# Patient Record
Sex: FEMALE | Race: WHITE
[De-identification: names, ages, dates, MRNs, and addresses within clinical notes are randomized per-mention and may not be internally consistent; named-entity substitution may affect disease eponyms.]

---

## 2018-04-04 ENCOUNTER — HOSPITAL ENCOUNTER (OUTPATIENT)
Dept: HOSPITAL 92 - BICULT | Age: 38
Discharge: HOME | End: 2018-04-04
Payer: COMMERCIAL

## 2018-04-04 PROCEDURE — 76536 US EXAM OF HEAD AND NECK: CPT

## 2019-04-21 ENCOUNTER — HOSPITAL ENCOUNTER (EMERGENCY)
Dept: HOSPITAL 57 - BURERS | Age: 39
Discharge: HOME | End: 2019-04-21
Payer: COMMERCIAL

## 2019-04-21 DIAGNOSIS — Z79.899: ICD-10-CM

## 2019-04-21 DIAGNOSIS — F17.210: ICD-10-CM
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
## 2019-04-21 DIAGNOSIS — S80.01XA: Primary | ICD-10-CM

## 2019-04-21 NOTE — RAD
RIGHT KNEE FOUR VIEWS

4/21/19

 

No fracture or large joint effusion was seen. At most, there might be a small effusion. All bones hernan
eared intact. The articular surfaces are normal in appearance.

 

IMPRESSION: 

No acute bony finding. 

 

POS: HOME

## 2019-11-21 ENCOUNTER — HOSPITAL ENCOUNTER (OUTPATIENT)
Dept: HOSPITAL 92 - BICMAMMO | Age: 39
Discharge: HOME | End: 2019-11-21
Payer: COMMERCIAL

## 2019-11-21 DIAGNOSIS — N63.0: Primary | ICD-10-CM

## 2019-11-21 PROCEDURE — 77066 DX MAMMO INCL CAD BI: CPT

## 2019-11-21 PROCEDURE — G0279 TOMOSYNTHESIS, MAMMO: HCPCS

## 2019-11-21 NOTE — ULT
LIMITED RIGHT BREAST ULTRASOUND:

 

11/21/2019

 

PROVIDED CLINICAL HISTORY:

Right breast palpable abnormality.

Abnormal mammogram.

 

FINDINGS:

Limited sonographic interrogation was performed of the right breast in the region of palpable concern
. The sonographic appearance of the breast parenchyma in this region is normal.

 

Sonographic interrogation of the right breast in the region of mammographic concern demonstrates a hy
poechoic, oval, smoothly marginated, wider than tall mass, measuring about 1.5 cm at the 9 o'clock po
sition, 6 cm from the nipple. Simple cysts are also seen in this region.

 

IMPRESSION:

1. No sonographic or mammographic abnormality to explain the palpable finding. Negative imaging findi
ngs should not preclude further evaluation of a clinically suspicious area. The patient is referred b
ack to her clinician.

 

2. 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 Six month follow-up ultrasoun
d is recommended. BI-RADS category 3 - probably benign findings.

 

POS: OFF

## 2019-11-21 NOTE — MMO
Bilateral MAMMO Bilat Diag [facility]+ANKITA.

 

CLINICAL HISTORY:

Patient is 39 years old and is seen for diagnostic exam and lump or thickening

in the right breast. The patient has no family history of breast cancer.  The

patient has no personal history of cancer.

 

VIEWS:

The views performed were:  bilateral craniocaudal with tomosynthesis; bilateral

mediolateral oblique with tomosynthesis; bilateral mediolateral with

tomosynthesis; right lateromedial oblique with tomosynthesis; and  cleavage view

with tomosynthesis.

 

FILMS COMPARED:

The present examination has been compared to a prior imaging study performed at

[facility] on 11/21/2019.

 

This study has been interpreted with the assistance of computer-aided detection.

 

MAMMOGRAM FINDINGS:

The breasts are heterogeneously dense, which could obscure a lesion on

mammography.

 

Finding 1:

 

There are no mammographic or sonographic abnormalities in the area of palpable

concern.  The patient is referred back to her clinician.  Negative imaging

findings should not preclude biopsy if clinical findings are suspicious.

 

Finding 2:  There is an equal density, oval mass with circumscribed margins seen

in the right breast at 9 o'clock.

 

This demonstrates probably benign findings on the ultrasound.

 

In the left breast, there are no suspicious masses, calcifications or areas of

architectural distortion.

 

IMPRESSION:

FINDING 1:  THERE ARE NO MAMMOGRAPHIC ABNORMALITIES IN THE AREA OF PALPABLE

CONCERN. THE PATIENT IS REFERRED BACK TO HER CLINICIAN. NEGATIVE IMAGING

FINDINGS SHOULD NOT PRECLUDE BIOPSY IF CLINICAL FINDINGS ARE SUSPICIOUS.

 

FINDING 2:  MASS IN THE RIGHT BREAST IS PROBABLY BENIGN. FOLLOW-UP IN 6 MONTHS

IS RECOMMENDED.

 

THE RESULTS OF THIS EXAM WERE SENT TO THE PATIENT.

 

ACR BI-RADS Category 3 - Probably benign finding - short interval follow-up

suggested. [facility] will notify the patient of the need for additional

imaging services.

 

MAMMOGRAPHY NOTE:

 1. A negative mammogram report should not delay a biopsy if a dominant of

 clinically suspicious mass is present.

 2. Approximately 10% to 15% of breast cancers are not detected by

 mammography.

 3. Adenosis and dense breasts may obscure an underlying neoplasm.

 

 

Reported by: SINDY ADDISON MD

Electonically Signed: [id number]

## 2020-08-28 ENCOUNTER — HOSPITAL ENCOUNTER (EMERGENCY)
Dept: HOSPITAL 57 - BURERS | Age: 40
Discharge: HOME | End: 2020-08-28
Payer: COMMERCIAL

## 2020-08-28 DIAGNOSIS — N13.2: Primary | ICD-10-CM

## 2020-08-28 DIAGNOSIS — F17.210: ICD-10-CM

## 2020-08-28 LAB
ALBUMIN SERPL BCG-MCNC: 4.3 G/DL (ref 3.5–5)
ALP SERPL-CCNC: 56 U/L (ref 40–110)
ALT SERPL W P-5'-P-CCNC: 15 U/L (ref 8–55)
ANION GAP SERPL CALC-SCNC: 14 MMOL/L (ref 10–20)
ANISOCYTOSIS BLD QL SMEAR: (no result) (100X)
AST SERPL-CCNC: 14 U/L (ref 5–34)
BACTERIA UR QL AUTO: (no result) HPF
BASOPHILS # BLD AUTO: 0.1 THOU/UL (ref 0–0.2)
BASOPHILS NFR BLD AUTO: 0.8 % (ref 0–1)
BILIRUB SERPL-MCNC: 0.3 MG/DL (ref 0.2–1.2)
BUN SERPL-MCNC: 12 MG/DL (ref 7–18.7)
CALCIUM SERPL-MCNC: 9 MG/DL (ref 7.8–10.44)
CHLORIDE SERPL-SCNC: 106 MMOL/L (ref 98–107)
CO2 SERPL-SCNC: 24 MMOL/L (ref 22–29)
CREAT CL PREDICTED SERPL C-G-VRATE: 0 ML/MIN (ref 70–130)
EOSINOPHIL # BLD AUTO: 0.2 THOU/UL (ref 0–0.7)
EOSINOPHIL NFR BLD AUTO: 2 % (ref 0–10)
GLOBULIN SER CALC-MCNC: 3 G/DL (ref 2.4–3.5)
GLUCOSE SERPL-MCNC: 103 MG/DL (ref 70–105)
HGB BLD-MCNC: 11.8 G/DL (ref 12–16)
LYMPHOCYTES # BLD AUTO: 2.4 THOU/UL (ref 1.2–3.4)
LYMPHOCYTES NFR BLD AUTO: 28.5 % (ref 21–51)
MCH RBC QN AUTO: 24.5 PG (ref 27–31)
MCV RBC AUTO: 81.2 FL (ref 78–98)
MDIFF COMPLETE?: YES
MONOCYTES # BLD AUTO: 0.6 THOU/UL (ref 0.11–0.59)
MONOCYTES NFR BLD AUTO: 7.2 % (ref 0–10)
MUCOUS THREADS UR QL AUTO: (no result) LPF
NEUTROPHILS # BLD AUTO: 5.1 THOU/UL (ref 1.4–6.5)
NEUTROPHILS NFR BLD AUTO: 61.5 % (ref 42–75)
PLATELET # BLD AUTO: 294 THOU/UL (ref 130–400)
POTASSIUM SERPL-SCNC: 3.4 MMOL/L (ref 3.5–5.1)
PREGU CONTROL BACKGROUND?: (no result)
PREGU CONTROL BAR APPEAR?: YES
PROT UR STRIP.AUTO-MCNC: 30 MG/DL
RBC # BLD AUTO: 4.82 MILL/UL (ref 4.2–5.4)
RBC UR QL AUTO: (no result) HPF (ref 0–3)
SODIUM SERPL-SCNC: 141 MMOL/L (ref 136–145)
SP GR UR STRIP: 1.02 (ref 1–1.04)
WBC # BLD AUTO: 8.3 THOU/UL (ref 4.8–10.8)

## 2020-08-28 PROCEDURE — 74176 CT ABD & PELVIS W/O CONTRAST: CPT

## 2020-08-28 PROCEDURE — 96374 THER/PROPH/DIAG INJ IV PUSH: CPT

## 2020-08-28 PROCEDURE — 80053 COMPREHEN METABOLIC PANEL: CPT

## 2020-08-28 PROCEDURE — 99406 BEHAV CHNG SMOKING 3-10 MIN: CPT

## 2020-08-28 PROCEDURE — 96375 TX/PRO/DX INJ NEW DRUG ADDON: CPT

## 2020-08-28 PROCEDURE — 81025 URINE PREGNANCY TEST: CPT

## 2020-08-28 PROCEDURE — 81003 URINALYSIS AUTO W/O SCOPE: CPT

## 2020-08-28 PROCEDURE — 85025 COMPLETE CBC W/AUTO DIFF WBC: CPT

## 2020-08-28 PROCEDURE — 96361 HYDRATE IV INFUSION ADD-ON: CPT

## 2020-08-28 PROCEDURE — 81015 MICROSCOPIC EXAM OF URINE: CPT

## 2020-08-28 NOTE — CT
CT ABDOMEN AND PELVIS WITHOUT CONTRAST:

8/28/20

 

Spiral CT of the abdomen and pelvis was performed using no oral or IV contrast to evaluate right side
d abdominal and flank pain. There are no prior scans available for comparison. 

 

The major finding on the study is bilateral renal calculi. There are also several distal right ureter
al calculi near the UVJ that are generally no more than 4 mm wide but there appear to be several calc
kavitha in a row. This is causing moderate to severe right hydronephrosis. There is no obstruction on the
 left side, just nonobstructing stones in the left kidney. The urinary bladder is not very distended,
 but I do not see any calculi within it. 

 

The lung bases are clear. The liver, spleen, pancreas, adrenal glands, gallbladder and abdominal aort
a were unremarkable within the limitations of a noncontrast study. 

 

There is no dilation of bowel to suggest obstruction. The appendix appears normal. Some of the wall o
f the right colon was questionably thickened but there is no stranding around it. No free air or free
 fluid was seen. 

 

CT of the pelvis showed no pelvic masses, fluid collections, or pertinent findings other than the dis
kenzie ureteral calculi. There may be a 2 cm right ovarian cyst. 

 

IMPRESSION: 

1.      Several distal right ureteral calculi near the UVJ that are causing moderate to severe right 
hydronephrosis. 

 

2.      Several nonobstructing renal calculi bilaterally. 

 

Preliminary report discussed with Dr. Parekh at 11:21 on 8/28/20. 

 

POS: HOME

## 2021-05-31 ENCOUNTER — HOSPITAL ENCOUNTER (EMERGENCY)
Dept: HOSPITAL 57 - BURERS | Age: 41
Discharge: HOME | End: 2021-05-31
Payer: COMMERCIAL

## 2021-05-31 DIAGNOSIS — D64.9: ICD-10-CM

## 2021-05-31 DIAGNOSIS — E03.9: ICD-10-CM

## 2021-05-31 DIAGNOSIS — F17.210: ICD-10-CM

## 2021-05-31 DIAGNOSIS — S76.191A: Primary | ICD-10-CM
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
## 2022-03-10 ENCOUNTER — HOSPITAL ENCOUNTER (EMERGENCY)
Dept: HOSPITAL 57 - BURERS | Age: 42
Discharge: HOME | End: 2022-03-10
Payer: COMMERCIAL

## 2022-03-10 DIAGNOSIS — S90.02XA: Primary | ICD-10-CM

## 2022-03-10 DIAGNOSIS — E03.9: ICD-10-CM
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
## 2022-03-10 DIAGNOSIS — F17.210: ICD-10-CM

## 2022-06-24 ENCOUNTER — HOSPITAL ENCOUNTER (EMERGENCY)
Dept: HOSPITAL 57 - BURERS | Age: 42
Discharge: HOME | End: 2022-06-24
Payer: COMMERCIAL

## 2022-06-24 DIAGNOSIS — D64.9: ICD-10-CM

## 2022-06-24 DIAGNOSIS — Z85.841: ICD-10-CM

## 2022-06-24 DIAGNOSIS — N83.202: ICD-10-CM

## 2022-06-24 DIAGNOSIS — N83.201: ICD-10-CM

## 2022-06-24 DIAGNOSIS — F17.210: ICD-10-CM

## 2022-06-24 DIAGNOSIS — E03.9: ICD-10-CM

## 2022-06-24 DIAGNOSIS — N20.0: Primary | ICD-10-CM

## 2022-06-24 DIAGNOSIS — Z85.3: ICD-10-CM

## 2022-06-24 LAB
BACTERIA UR QL AUTO: (no result) HPF
SP GR UR STRIP: 1.02 (ref 1–1.03)
WBC UR QL AUTO: (no result) HPF (ref 0–3)

## 2022-06-24 PROCEDURE — 81015 MICROSCOPIC EXAM OF URINE: CPT

## 2022-06-24 PROCEDURE — 81003 URINALYSIS AUTO W/O SCOPE: CPT

## 2022-06-24 PROCEDURE — 74176 CT ABD & PELVIS W/O CONTRAST: CPT

## 2022-06-24 PROCEDURE — 96374 THER/PROPH/DIAG INJ IV PUSH: CPT

## 2022-06-24 PROCEDURE — 96375 TX/PRO/DX INJ NEW DRUG ADDON: CPT

## 2022-12-05 ENCOUNTER — HOSPITAL ENCOUNTER (EMERGENCY)
Dept: HOSPITAL 57 - BURERS | Age: 42
Discharge: HOME | End: 2022-12-05
Payer: COMMERCIAL

## 2022-12-05 DIAGNOSIS — E03.9: ICD-10-CM

## 2022-12-05 DIAGNOSIS — F17.210: ICD-10-CM

## 2022-12-05 DIAGNOSIS — M65.331: Primary | ICD-10-CM

## 2022-12-05 DIAGNOSIS — Z79.899: ICD-10-CM

## 2022-12-15 ENCOUNTER — HOSPITAL ENCOUNTER (EMERGENCY)
Dept: HOSPITAL 57 - BURERS | Age: 42
Discharge: HOME | End: 2022-12-15
Payer: COMMERCIAL

## 2022-12-15 DIAGNOSIS — E03.9: ICD-10-CM

## 2022-12-15 DIAGNOSIS — F17.210: ICD-10-CM

## 2022-12-15 DIAGNOSIS — J45.901: Primary | ICD-10-CM

## 2022-12-15 PROCEDURE — 71045 X-RAY EXAM CHEST 1 VIEW: CPT

## 2022-12-15 PROCEDURE — 96372 THER/PROPH/DIAG INJ SC/IM: CPT

## 2022-12-15 PROCEDURE — 87804 INFLUENZA ASSAY W/OPTIC: CPT

## 2023-04-27 ENCOUNTER — HOSPITAL ENCOUNTER (EMERGENCY)
Dept: HOSPITAL 57 - BURERS | Age: 43
Discharge: HOME | End: 2023-04-27
Payer: COMMERCIAL

## 2023-04-27 DIAGNOSIS — E11.65: Primary | ICD-10-CM

## 2023-04-27 DIAGNOSIS — E87.6: ICD-10-CM

## 2023-04-27 DIAGNOSIS — E03.9: ICD-10-CM

## 2023-04-27 DIAGNOSIS — Z79.4: ICD-10-CM

## 2023-04-27 DIAGNOSIS — Z79.899: ICD-10-CM

## 2023-04-27 DIAGNOSIS — F17.210: ICD-10-CM

## 2023-04-27 DIAGNOSIS — N39.0: ICD-10-CM

## 2023-04-27 LAB
ALBUMIN SERPL BCG-MCNC: 4.3 G/DL (ref 3.5–5)
ALP SERPL-CCNC: 76 U/L (ref 40–110)
ALT SERPL W P-5'-P-CCNC: 39 U/L (ref 8–55)
ANION GAP SERPL CALC-SCNC: 12 MMOL/L (ref 10–20)
AST SERPL-CCNC: 29 U/L (ref 5–34)
BACTERIA UR QL AUTO: (no result) HPF
BASOPHILS # BLD AUTO: 0.1 THOU/UL (ref 0–0.2)
BASOPHILS NFR BLD AUTO: 0.8 % (ref 0–1)
BILIRUB SERPL-MCNC: 0.2 MG/DL (ref 0.2–1.2)
BUN SERPL-MCNC: 8 MG/DL (ref 7–18.7)
CALCIUM SERPL-MCNC: 10 MG/DL (ref 7.8–10.44)
CHLORIDE SERPL-SCNC: 105 MMOL/L (ref 98–107)
CO2 SERPL-SCNC: 25 MMOL/L (ref 22–29)
CREAT CL PREDICTED SERPL C-G-VRATE: 0 ML/MIN (ref 70–130)
EOSINOPHIL # BLD AUTO: 0.2 THOU/UL (ref 0–0.7)
EOSINOPHIL NFR BLD AUTO: 2.1 % (ref 0–10)
GLOBULIN SER CALC-MCNC: 3.4 G/DL (ref 2.4–3.5)
GLUCOSE SERPL-MCNC: 150 MG/DL (ref 70–105)
HGB BLD-MCNC: 14.7 G/DL (ref 12–16)
LYMPHOCYTES # BLD AUTO: 2.6 THOU/UL (ref 1.2–3.4)
LYMPHOCYTES NFR BLD AUTO: 28.9 % (ref 21–51)
MCH RBC QN AUTO: 29.5 PG (ref 27–31)
MCV RBC AUTO: 93 FL (ref 78–98)
MONOCYTES # BLD AUTO: 0.3 THOU/UL (ref 0.11–0.59)
MONOCYTES NFR BLD AUTO: 2.8 % (ref 0–10)
NEUTROPHILS # BLD AUTO: 5.9 THOU/UL (ref 1.4–6.5)
NEUTROPHILS NFR BLD AUTO: 65.3 % (ref 42–75)
PLATELET # BLD AUTO: 289 10X3/UL (ref 130–400)
POTASSIUM SERPL-SCNC: 2.9 MMOL/L (ref 3.5–5.1)
RBC # BLD AUTO: 4.97 MILL/UL (ref 4.2–5.4)
SODIUM SERPL-SCNC: 139 MMOL/L (ref 136–145)
SP GR UR STRIP: 1.02 (ref 1–1.03)
WBC # BLD AUTO: 9.1 10X3/UL (ref 4.8–10.8)
WBC UR QL AUTO: (no result) HPF (ref 0–3)

## 2023-04-27 PROCEDURE — 36416 COLLJ CAPILLARY BLOOD SPEC: CPT

## 2023-04-27 PROCEDURE — 81015 MICROSCOPIC EXAM OF URINE: CPT

## 2023-04-27 PROCEDURE — 81003 URINALYSIS AUTO W/O SCOPE: CPT

## 2023-04-27 PROCEDURE — 85025 COMPLETE CBC W/AUTO DIFF WBC: CPT

## 2023-04-27 PROCEDURE — 80053 COMPREHEN METABOLIC PANEL: CPT

## 2023-04-27 PROCEDURE — 96360 HYDRATION IV INFUSION INIT: CPT

## 2023-09-02 ENCOUNTER — HOSPITAL ENCOUNTER (EMERGENCY)
Dept: HOSPITAL 57 - BURERS | Age: 43
Discharge: HOME | End: 2023-09-02
Payer: COMMERCIAL

## 2023-09-02 DIAGNOSIS — M79.641: Primary | ICD-10-CM

## 2023-09-02 DIAGNOSIS — E03.9: ICD-10-CM

## 2023-09-02 DIAGNOSIS — D50.9: ICD-10-CM

## 2023-09-02 DIAGNOSIS — Z79.4: ICD-10-CM

## 2023-09-02 DIAGNOSIS — F17.210: ICD-10-CM

## 2023-09-02 DIAGNOSIS — E11.9: ICD-10-CM

## 2023-09-02 DIAGNOSIS — Z79.899: ICD-10-CM

## 2023-09-02 PROCEDURE — 99283 EMERGENCY DEPT VISIT LOW MDM: CPT
